# Patient Record
Sex: FEMALE | URBAN - METROPOLITAN AREA
[De-identification: names, ages, dates, MRNs, and addresses within clinical notes are randomized per-mention and may not be internally consistent; named-entity substitution may affect disease eponyms.]

---

## 2017-11-07 ENCOUNTER — APPOINTMENT (RX ONLY)
Dept: URBAN - METROPOLITAN AREA CLINIC 288 | Facility: CLINIC | Age: 72
Setting detail: DERMATOLOGY
End: 2017-11-07

## 2017-11-07 VITALS — HEIGHT: 67 IN | WEIGHT: 145 LBS

## 2017-11-07 DIAGNOSIS — L81.4 OTHER MELANIN HYPERPIGMENTATION: ICD-10-CM

## 2017-11-07 DIAGNOSIS — L21.8 OTHER SEBORRHEIC DERMATITIS: ICD-10-CM

## 2017-11-07 PROBLEM — E03.9 HYPOTHYROIDISM, UNSPECIFIED: Status: ACTIVE | Noted: 2017-11-07

## 2017-11-07 PROCEDURE — 99202 OFFICE O/P NEW SF 15 MIN: CPT

## 2017-11-07 PROCEDURE — ? PATIENT SPECIFIC COUNSELING

## 2017-11-07 PROCEDURE — ? NOTED ON EXAM BUT NOT TREATED

## 2017-11-07 PROCEDURE — ? COUNSELING

## 2017-11-07 ASSESSMENT — LOCATION SIMPLE DESCRIPTION DERM
LOCATION SIMPLE: RIGHT CHEEK
LOCATION SIMPLE: RIGHT EAR
LOCATION SIMPLE: LEFT EAR
LOCATION SIMPLE: LEFT CHEEK
LOCATION SIMPLE: LEFT HAND
LOCATION SIMPLE: RIGHT HAND

## 2017-11-07 ASSESSMENT — LOCATION ZONE DERM
LOCATION ZONE: HAND
LOCATION ZONE: EAR
LOCATION ZONE: FACE

## 2017-11-07 ASSESSMENT — LOCATION DETAILED DESCRIPTION DERM
LOCATION DETAILED: RIGHT RADIAL DORSAL HAND
LOCATION DETAILED: RIGHT CYMBA CONCHA
LOCATION DETAILED: LEFT INFERIOR CENTRAL MALAR CHEEK
LOCATION DETAILED: LEFT CYMBA CONCHA
LOCATION DETAILED: RIGHT INFERIOR CENTRAL MALAR CHEEK
LOCATION DETAILED: LEFT ULNAR DORSAL HAND

## 2017-11-07 NOTE — PROCEDURE: PATIENT SPECIFIC COUNSELING
Discussed spf 30 or higher
Detail Level: Simple
Patient admits to picking at lesion, nothing visible on exam today, cannot pin point where tender lesion is exactly located, patient will stop picking and will rtc for possible punch biopsy in 6 weeks

## 2017-11-07 NOTE — PROCEDURE: MIPS QUALITY
Quality 110: Preventive Care And Screening: Influenza Immunization: Influenza Immunization Administered during Influenza season
Quality 431: Preventive Care And Screening: Unhealthy Alcohol Use - Screening: Patient screened for unhealthy alcohol use using a single question and scores less than 2 times per year
Quality 128: Preventive Care And Screening: Body Mass Index (Bmi) Screening And Follow-Up Plan: BMI is documented within normal parameters and no follow-up plan is required.
Quality 226: Preventive Care And Screening: Tobacco Use: Screening And Cessation Intervention: Patient screened for tobacco and never smoked
Detail Level: Detailed
Quality 130: Documentation Of Current Medications In The Medical Record: Current Medications Documented

## 2018-08-07 ENCOUNTER — APPOINTMENT (RX ONLY)
Dept: URBAN - METROPOLITAN AREA CLINIC 293 | Facility: CLINIC | Age: 73
Setting detail: DERMATOLOGY
End: 2018-08-07

## 2018-08-07 DIAGNOSIS — L82.0 INFLAMED SEBORRHEIC KERATOSIS: ICD-10-CM

## 2018-08-07 DIAGNOSIS — Z00.00 ENCOUNTER FOR GENERAL ADULT MEDICAL EXAMINATION WITHOUT ABNORMAL FINDINGS: ICD-10-CM

## 2018-08-07 DIAGNOSIS — L98.8 OTHER SPECIFIED DISORDERS OF THE SKIN AND SUBCUTANEOUS TISSUE: ICD-10-CM

## 2018-08-07 DIAGNOSIS — L40.0 PSORIASIS VULGARIS: ICD-10-CM

## 2018-08-07 DIAGNOSIS — L81.4 OTHER MELANIN HYPERPIGMENTATION: ICD-10-CM

## 2018-08-07 DIAGNOSIS — B07.0 PLANTAR WART: ICD-10-CM

## 2018-08-07 DIAGNOSIS — L85.3 XEROSIS CUTIS: ICD-10-CM

## 2018-08-07 PROBLEM — Z71.1 PERSON WITH FEARED HEALTH COMPLAINT IN WHOM NO DIAGNOSIS IS MADE: Status: ACTIVE | Noted: 2018-08-07

## 2018-08-07 PROCEDURE — ? LIQUID NITROGEN

## 2018-08-07 PROCEDURE — 17110 DESTRUCTION B9 LES UP TO 14: CPT

## 2018-08-07 PROCEDURE — ? COUNSELING

## 2018-08-07 PROCEDURE — 99214 OFFICE O/P EST MOD 30 MIN: CPT | Mod: 25

## 2018-08-07 PROCEDURE — ? ADDITIONAL NOTES

## 2018-08-07 PROCEDURE — ? PRESCRIPTION

## 2018-08-07 RX ORDER — FLUOCINONIDE 0.5 MG/ML
SOLUTION TOPICAL
Qty: 1 | Refills: 3 | Status: ERX | COMMUNITY
Start: 2018-08-07

## 2018-08-07 RX ADMIN — FLUOCINONIDE: 0.5 SOLUTION TOPICAL at 17:15

## 2018-08-07 ASSESSMENT — LOCATION ZONE DERM
LOCATION ZONE: FACE
LOCATION ZONE: TRUNK
LOCATION ZONE: SCALP
LOCATION ZONE: FEET
LOCATION ZONE: LIP
LOCATION ZONE: HAND

## 2018-08-07 ASSESSMENT — LOCATION SIMPLE DESCRIPTION DERM
LOCATION SIMPLE: RIGHT UPPER BACK
LOCATION SIMPLE: LEFT CHEEK
LOCATION SIMPLE: LEFT PLANTAR SURFACE
LOCATION SIMPLE: RIGHT HAND
LOCATION SIMPLE: POSTERIOR SCALP
LOCATION SIMPLE: LEFT LIP

## 2018-08-07 ASSESSMENT — LOCATION DETAILED DESCRIPTION DERM
LOCATION DETAILED: LEFT INFERIOR VERMILION LIP
LOCATION DETAILED: LEFT MEDIAL PLANTAR MIDFOOT
LOCATION DETAILED: RIGHT INFERIOR OCCIPITAL SCALP
LOCATION DETAILED: RIGHT INFERIOR MEDIAL UPPER BACK
LOCATION DETAILED: RIGHT RADIAL DORSAL HAND
LOCATION DETAILED: LEFT MEDIAL MALAR CHEEK

## 2018-08-07 NOTE — PROCEDURE: LIQUID NITROGEN
Detail Level: Detailed
Add 52 Modifier (Optional): no
Medical Necessity Clause: This procedure was medically necessary because the lesions that were treated were:
Medical Necessity Information: It is in your best interest to select a reason for this procedure from the list below. All of these items fulfill various CMS LCD requirements except the new and changing color options.
Post-Care Instructions: I reviewed with the patient in detail post-care instructions. Patient is to wear sunprotection, and avoid picking at any of the treated lesions. Pt may apply Vaseline to crusted or scabbing areas.
Number Of Freeze-Thaw Cycles: 2 freeze-thaw cycles
Consent: The patient's consent was obtained including but not limited to risks of crusting, scabbing, blistering, scarring, darker or lighter pigmentary change, recurrence, incomplete removal and infection.
Render Post-Care Instructions In Note?: yes

## 2018-08-07 NOTE — PROCEDURE: ADDITIONAL NOTES
Additional Notes: Advised pt to see [facility] for laser
Additional Notes: Advised pt to get over the counter mediplast
Additional Notes: No lesion present today. Advised pt to rtc when it reappears
Additional Notes: Will recommend pt following up with [facility] for laser

## 2018-08-07 NOTE — PROCEDURE: MIPS QUALITY
Quality 431: Preventive Care And Screening: Unhealthy Alcohol Use - Screening: Patient screened for unhealthy alcohol use using a single question and scores less than 2 times per year
Quality 128: Preventive Care And Screening: Body Mass Index (Bmi) Screening And Follow-Up Plan: BMI is documented within normal parameters and no follow-up plan is required.
Quality 226: Preventive Care And Screening: Tobacco Use: Screening And Cessation Intervention: Patient screened for tobacco and never smoked
Quality 130: Documentation Of Current Medications In The Medical Record: Current Medications Documented
Quality 110: Preventive Care And Screening: Influenza Immunization: Influenza Immunization Administered during Influenza season
Detail Level: Detailed